# Patient Record
Sex: FEMALE | Race: BLACK OR AFRICAN AMERICAN | NOT HISPANIC OR LATINO | URBAN - METROPOLITAN AREA
[De-identification: names, ages, dates, MRNs, and addresses within clinical notes are randomized per-mention and may not be internally consistent; named-entity substitution may affect disease eponyms.]

---

## 2018-06-09 ENCOUNTER — EMERGENCY (EMERGENCY)
Facility: HOSPITAL | Age: 58
LOS: 1 days | Discharge: HOME | End: 2018-06-09
Attending: EMERGENCY MEDICINE | Admitting: EMERGENCY MEDICINE
Payer: COMMERCIAL

## 2018-06-09 VITALS
SYSTOLIC BLOOD PRESSURE: 127 MMHG | DIASTOLIC BLOOD PRESSURE: 52 MMHG | TEMPERATURE: 98 F | HEART RATE: 91 BPM | RESPIRATION RATE: 18 BRPM

## 2018-06-09 VITALS
TEMPERATURE: 98 F | SYSTOLIC BLOOD PRESSURE: 125 MMHG | OXYGEN SATURATION: 100 % | RESPIRATION RATE: 18 BRPM | DIASTOLIC BLOOD PRESSURE: 59 MMHG | HEART RATE: 76 BPM

## 2018-06-09 DIAGNOSIS — N81.4 UTEROVAGINAL PROLAPSE, UNSPECIFIED: Chronic | ICD-10-CM

## 2018-06-09 PROCEDURE — 99284 EMERGENCY DEPT VISIT MOD MDM: CPT

## 2018-06-09 PROCEDURE — 93971 EXTREMITY STUDY: CPT | Mod: 26,LT

## 2018-06-09 RX ORDER — ACETAMINOPHEN 500 MG
650 TABLET ORAL ONCE
Qty: 0 | Refills: 0 | Status: COMPLETED | OUTPATIENT
Start: 2018-06-09 | End: 2018-06-09

## 2018-06-09 RX ADMIN — Medication 300 MILLIGRAM(S): at 20:24

## 2018-06-09 NOTE — ED PROVIDER NOTE - PLAN OF CARE
during your ED visit you were evaluated for Right lower extremity pain. You had an ultrasound which was negative. Take clindamycin 300mg every 8 hours for 7 days. Take lactobacillus or other probiotic while on clinda. Follow up with your pcp. Take tylenol as needed for pain. Return to the ED if you exhibit any new, continued or worsening symptoms.

## 2018-06-09 NOTE — ED PROVIDER NOTE - CARE PLAN
Principal Discharge DX:	Cellulitis  Assessment and plan of treatment:	during your ED visit you were evaluated for Right lower extremity pain. You had an ultrasound which was negative. Take clindamycin 300mg every 8 hours for 7 days. Take lactobacillus or other probiotic while on clinda. Follow up with your pcp. Take tylenol as needed for pain. Return to the ED if you exhibit any new, continued or worsening symptoms.

## 2018-06-09 NOTE — ED PROVIDER NOTE - OBJECTIVE STATEMENT
57 y/o F with no PMH p/w RLE pain x 1 day. pt. states she woke up with pain in her right calf. Pain is located over the posterior right side of her calf. She states the area is warm and tender. Denies fever, chills, chest pain, SOB, nausea, vomiting, diarrhea. She states pain is located over the calf. She denies smoking, or trauma to the leg. 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

## 2018-06-09 NOTE — ED PROVIDER NOTE - ATTENDING CONTRIBUTION TO CARE
Seen and examined, RLE redness/swelling, mild tenderness, no fluctuance, no discharge, no skin lesions, no crusting. Good distal pulses, normal sensation.

## 2018-06-09 NOTE — ED PROVIDER NOTE - PROGRESS NOTE DETAILS
JW No evidence of DVT.  Cellulitis.  Repeat exam:   RLE Blanching red skin rash.  No ulceration or obvious deformity.  Skin otherwise  normal tone for race.  Sensation and motor function in the distal extremity intact.  2+ tibial and pedal pulses.  No pallor, poikilothermia, paresthesia, paralysis, pulselessness, or cyanosis.  No pain out of proportion to exam.  Extremity compartments soft without any sign of compartment syndrome.  PT prescribed clindamycin.  I reviewed the alarm symptoms of this patient's diagnosis and discussed criteria for their return to the emergency department.  I instructed the patient to return to the emergency department with any alarm symptoms for their specific diagnosis including fevers, chills, pain, any worsening symptoms, and any other concerns.  I instructed this patient to call their primary doctor today, to inform them of their visit to the emergency department, and to obtain a repeat evaluation in the next 24 hours.  This patient understood and agreed with our plan for follow up and felt safe returning home.  At the time of discharge this patient remained in stable condition, in no acute distress, with stable vital signs.

## 2018-06-09 NOTE — ED ADULT NURSE NOTE - OBJECTIVE STATEMENT
Alert and oriented x 4. Pt received to spot 13 due to right calf pain. Pt states : " I suddenly have right lower calf redness and swelling. " Pt right lower leg is red and tender to touch. Pt denies chest pain ,shortness of breath, nausea, vomiting or dizziness. No painful urination or diarrhea. MD at bedside. Pt awaiting ultrasound. Will continue to monitor. RN Break Coverage.

## 2021-06-10 ENCOUNTER — RESULT REVIEW (OUTPATIENT)
Age: 61
End: 2021-06-10

## 2022-09-29 PROBLEM — Z00.00 ENCOUNTER FOR PREVENTIVE HEALTH EXAMINATION: Status: ACTIVE | Noted: 2022-09-29

## 2022-10-03 ENCOUNTER — APPOINTMENT (OUTPATIENT)
Dept: SURGERY | Facility: CLINIC | Age: 62
End: 2022-10-03

## 2022-10-03 VITALS
DIASTOLIC BLOOD PRESSURE: 83 MMHG | SYSTOLIC BLOOD PRESSURE: 138 MMHG | BODY MASS INDEX: 29.02 KG/M2 | HEART RATE: 62 BPM | WEIGHT: 170 LBS | HEIGHT: 64 IN

## 2022-10-03 DIAGNOSIS — Z78.9 OTHER SPECIFIED HEALTH STATUS: ICD-10-CM

## 2022-10-03 DIAGNOSIS — N60.99 UNSPECIFIED BENIGN MAMMARY DYSPLASIA OF UNSPECIFIED BREAST: ICD-10-CM

## 2022-10-03 DIAGNOSIS — Z01.818 ENCOUNTER FOR OTHER PREPROCEDURAL EXAMINATION: ICD-10-CM

## 2022-10-03 DIAGNOSIS — E11.9 TYPE 2 DIABETES MELLITUS W/OUT COMPLICATIONS: ICD-10-CM

## 2022-10-03 PROCEDURE — 99203 OFFICE O/P NEW LOW 30 MIN: CPT

## 2022-10-03 RX ORDER — METFORMIN HYDROCHLORIDE 625 MG/1
TABLET ORAL
Refills: 0 | Status: ACTIVE | COMMUNITY

## 2022-10-04 NOTE — REASON FOR VISIT
[Consultation] : a consultation visit [FreeTextEntry1] : R Breast ADH, involving Intraductal Papilloma

## 2022-10-04 NOTE — PLAN
[FreeTextEntry1] : Ms. YAHAIRA BAILEY was informed of significance of findings. All the options, risks and benefits were explained. Informed consent for excision of right breast lesion (ADH/Intraductal Papilloma) with pre op spot localization and potential risks, benefits and alternatives to the planned surgery were discussed in depth. All surgical options were discussed including non-surgical treatments. She wishes to proceed with surgery. We will plan for surgery on at the next available date, pending any required insurance pre-certification or pre-approval. She agrees to obtain any necessary pre-operative evaluations and testing prior to surgery.\par Patient advised to seek immediate medical attention with any acute change in symptoms or with the development of any new or worsening symptoms. Patient's questions and concerns addressed to patient's satisfaction, and patient verbalized an understanding of the information discussed.\par \par \par

## 2022-10-04 NOTE — DATA REVIEWED
[FreeTextEntry1] : Patient: YAHAIRA BAILEY\par YOB: 1960\par Phone: [phone]\par MRN: [id number] Acc: [id number]\par Date of Exam: 08-\par  \par EXAM: DIGITAL BILATERAL SCREENING MAMMOGRAM WITH CAD AND TOMOSYNTHESIS AND BREAST ULTRASOUND\par \par HISTORY: Screening\par \par CLINICAL BREAST EXAMINATION: The patient reports that her last clinical breast exam was within the past year. \par \par COMPARISON: 2021, 2017, 2011.\par \par MAMMOGRAM:\par \par TECHNIQUE: The following views were obtained digitally: bilateral craniocaudal, bilateral mediolateral oblique. Low-dose full-field digital breast tomosynthesis examination was performed with tomosynthesis acquisitions and synthesized 2D reconstructed mammogram. Computer-aided detection (CAD) was utilized.  \par \par FINDINGS: \par BREAST COMPOSITION: There are scattered areas of fibroglandular density.\par \par No suspicious masses, architectural distortion, or significant calcifications are detected.\par \par BREAST ULTRASOUND:\par \par TECHNIQUE: A bilateral breast ultrasound was performed with complete evaluation of the four quadrants, retroareolar region, and axilla. \par \par FINDINGS: \par Right breast 8:00 1 cm from the nipple is a 0.2 cm complicated cyst, stable compared to 6/17/2021 ultrasound. \par Right breast 10:00 1 cm from the nipple there is a 0.3 cm complicated cyst, indeterminate. Ultrasound-guided biopsy is recommended.\par Right breast 11:00 1 cm from the nipple is a 0.3 cm complicated cyst, stable compared to 6/17/21 ultrasound.\par Left breast 12:00 1 cm from the nipple there is a 0.2 cm cyst.\par No other solid or cystic mass is seen in either breast. No axillary adenopathy.\par \par IMPRESSION: \par 1. Complicated cyst at right 10:00 1 cm from the nipple is indeterminate. Ultrasound-guided biopsy is recommended.\par 2. No suspicious mammographic findings.\par FOLLOW-UP: Ultrasound guided biopsy.\par \par \par \par Patient: YAHAIRA BAILEY\par YOB: 1960\par Date of Exam: 08-\par  \par EXAM:  ULTRASOUND-GUIDED CORE BIOPSY 1 SITE\par \par HISTORY:  Further evaluation with an ultrasound-guided core biopsy was recommended for a right 10 o'clock  axis lesion.\par \par COMPARISON:  Prior breast imaging studies dated 8/12/2022 \par \par PROCEDURE: Targeted ultrasound performed prior to the procedure reconfirms the suspicious finding: at the right 10 o'clock location, 1 cm from the nipple. \par \par The risks and benefits of the procedure were conveyed to the patient, and the patient consented to the procedure. Universal timeout was performed.\par \par Using sterile technique, 10 mL of lidocaine was administered. A skin incision was made and an introducer was placed prior to insertion of the biopsy needle. Under sonographic visualization, a 13-gauge [employer] Marquee spring-loaded core biopsy device was used to sample the target. 5 samples were obtained. A ribbon shaped biopsy clip was deployed at the biopsy site.\par \par A postprocedure mammogram demonstrates appropriate placement of the clip. \par \par The patient tolerated the procedure well without complications. The patient was given postbiopsy care instructions. The specimen was subsequently sent to the pathology lab. \par \par IMPRESSION:  1 site ultrasound-guided core biopsy was performed. \par \par PATHOLOGY\par The pathology report for ultrasound-guided core biopsy of a right 10 o'clock  axis lesion was performed by the pathology department at [facility] as atypical ductal hyperplasia involving intraductal papilloma.\par In view of the histology results, recommend surgical consultation for excisional biopsy.

## 2022-10-04 NOTE — CONSULT LETTER
[Dear  ___] : Dear  [unfilled], [Consult Letter:] : I had the pleasure of evaluating your patient, [unfilled]. [Consult Closing:] : Thank you very much for allowing me to participate in the care of this patient.  If you have any questions, please do not hesitate to contact me. [Sincerely,] : Sincerely, [FreeTextEntry3] : Jovani Sevilla MD\par

## 2022-10-04 NOTE — HISTORY OF PRESENT ILLNESS
[de-identified] : YAHAIRA BAILEY is a 62 year old F w PMHX T2DM,  who is referred to the office for consultation visit, she presents w the cc of having abnormal finding w recent breast imaging. Patient states this was her first breast biopsy. No personal history of breast CA. Family history of breast CA includes, sister.\par Patient had US guided core biopsy of the right breast at 10:00, 08/31/22 c/w ADH involving intraductal papilloma. \par Patient denies feeling any masses or lumps. No pain. No breast/nipple discharge reported. \par

## 2022-10-04 NOTE — PHYSICAL EXAM
[No Rash or Lesion] : No rash or lesion [Alert] : alert [Oriented to Person] : oriented to person [Oriented to Place] : oriented to place [Oriented to Time] : oriented to time [Calm] : calm [de-identified] : A/Ox3; NAD. appears comfortable [de-identified] : EOMI; sclera anicteric. [de-identified] : no cervical lymphadenopathy  [de-identified] : airway patent, no use of accessory muscles [de-identified] : No chest deformity, No asymmetry, Normal contours,No nodules, No masses, No axillary adenopathy, No nipple discharge,No tenderness\par  [de-identified] : abd is soft, NT/ND\par  [de-identified] : +ROM, normal gait

## 2022-10-04 NOTE — ASSESSMENT
[FreeTextEntry1] : Ms. BAILEY is a 62 year y/o F who presents w ADH and Intraductal Papilloma of the Right Breast at 10:00.

## 2022-10-12 ENCOUNTER — OUTPATIENT (OUTPATIENT)
Dept: OUTPATIENT SERVICES | Facility: HOSPITAL | Age: 62
LOS: 1 days | End: 2022-10-12
Payer: COMMERCIAL

## 2022-10-12 VITALS
SYSTOLIC BLOOD PRESSURE: 147 MMHG | RESPIRATION RATE: 16 BRPM | HEIGHT: 64 IN | OXYGEN SATURATION: 97 % | TEMPERATURE: 98 F | DIASTOLIC BLOOD PRESSURE: 84 MMHG | WEIGHT: 171.96 LBS | HEART RATE: 61 BPM

## 2022-10-12 DIAGNOSIS — N60.99 UNSPECIFIED BENIGN MAMMARY DYSPLASIA OF UNSPECIFIED BREAST: ICD-10-CM

## 2022-10-12 DIAGNOSIS — Z98.891 HISTORY OF UTERINE SCAR FROM PREVIOUS SURGERY: Chronic | ICD-10-CM

## 2022-10-12 DIAGNOSIS — D24.1 BENIGN NEOPLASM OF RIGHT BREAST: ICD-10-CM

## 2022-10-12 DIAGNOSIS — E11.9 TYPE 2 DIABETES MELLITUS WITHOUT COMPLICATIONS: ICD-10-CM

## 2022-10-12 DIAGNOSIS — Z29.9 ENCOUNTER FOR PROPHYLACTIC MEASURES, UNSPECIFIED: ICD-10-CM

## 2022-10-12 DIAGNOSIS — Z01.818 ENCOUNTER FOR OTHER PREPROCEDURAL EXAMINATION: ICD-10-CM

## 2022-10-12 DIAGNOSIS — N81.4 UTEROVAGINAL PROLAPSE, UNSPECIFIED: Chronic | ICD-10-CM

## 2022-10-12 LAB
ANION GAP SERPL CALC-SCNC: 6 MMOL/L — SIGNIFICANT CHANGE UP (ref 5–17)
APTT BLD: 35.4 SEC — SIGNIFICANT CHANGE UP (ref 27.5–35.5)
BUN SERPL-MCNC: 11 MG/DL — SIGNIFICANT CHANGE UP (ref 7–18)
CALCIUM SERPL-MCNC: 9.2 MG/DL — SIGNIFICANT CHANGE UP (ref 8.4–10.5)
CHLORIDE SERPL-SCNC: 106 MMOL/L — SIGNIFICANT CHANGE UP (ref 96–108)
CO2 SERPL-SCNC: 28 MMOL/L — SIGNIFICANT CHANGE UP (ref 22–31)
CREAT SERPL-MCNC: 0.69 MG/DL — SIGNIFICANT CHANGE UP (ref 0.5–1.3)
EGFR: 97 ML/MIN/1.73M2 — SIGNIFICANT CHANGE UP
GLUCOSE SERPL-MCNC: 110 MG/DL — HIGH (ref 70–99)
HCT VFR BLD CALC: 43.9 % — SIGNIFICANT CHANGE UP (ref 34.5–45)
HGB BLD-MCNC: 13.6 G/DL — SIGNIFICANT CHANGE UP (ref 11.5–15.5)
INR BLD: 1.07 RATIO — SIGNIFICANT CHANGE UP (ref 0.88–1.16)
MCHC RBC-ENTMCNC: 27.7 PG — SIGNIFICANT CHANGE UP (ref 27–34)
MCHC RBC-ENTMCNC: 31 GM/DL — LOW (ref 32–36)
MCV RBC AUTO: 89.4 FL — SIGNIFICANT CHANGE UP (ref 80–100)
NRBC # BLD: 0 /100 WBCS — SIGNIFICANT CHANGE UP (ref 0–0)
PLATELET # BLD AUTO: 328 K/UL — SIGNIFICANT CHANGE UP (ref 150–400)
POTASSIUM SERPL-MCNC: 4.1 MMOL/L — SIGNIFICANT CHANGE UP (ref 3.5–5.3)
POTASSIUM SERPL-SCNC: 4.1 MMOL/L — SIGNIFICANT CHANGE UP (ref 3.5–5.3)
PROTHROM AB SERPL-ACNC: 12.8 SEC — SIGNIFICANT CHANGE UP (ref 10.5–13.4)
RBC # BLD: 4.91 M/UL — SIGNIFICANT CHANGE UP (ref 3.8–5.2)
RBC # FLD: 14.3 % — SIGNIFICANT CHANGE UP (ref 10.3–14.5)
SODIUM SERPL-SCNC: 140 MMOL/L — SIGNIFICANT CHANGE UP (ref 135–145)
WBC # BLD: 5.62 K/UL — SIGNIFICANT CHANGE UP (ref 3.8–10.5)
WBC # FLD AUTO: 5.62 K/UL — SIGNIFICANT CHANGE UP (ref 3.8–10.5)

## 2022-10-12 PROCEDURE — 36415 COLL VENOUS BLD VENIPUNCTURE: CPT

## 2022-10-12 PROCEDURE — 85027 COMPLETE CBC AUTOMATED: CPT

## 2022-10-12 PROCEDURE — G0463: CPT

## 2022-10-12 PROCEDURE — 80048 BASIC METABOLIC PNL TOTAL CA: CPT

## 2022-10-12 PROCEDURE — 85610 PROTHROMBIN TIME: CPT

## 2022-10-12 PROCEDURE — 85730 THROMBOPLASTIN TIME PARTIAL: CPT

## 2022-10-12 NOTE — H&P PST ADULT - PROBLEM SELECTOR PLAN 1
Robotic Assisted Total Laparoscopic Hysterectomy with Bilateral Salpingo - Oophorectomy  [place] Lymph Node Mapping and Dissection  Cystoscopy      STOP BANG : 2   Low risk for SHIVA complications

## 2022-10-12 NOTE — H&P PST ADULT - HISTORY OF PRESENT ILLNESS
61 yo female with history of DM, Asthma (last hospitalization over 30 years, never been intubated), reports the above.  Patient states her routine mammo was abnormal, Biopsy of the right breast revealed Atypical Ductal Hyperplasia involving intraductal papilloma,   She is scheduled for : Right Breast Pre-Op Needle Localization and Excision of Lesion, 10/19/22

## 2022-10-12 NOTE — H&P PST ADULT - NSICDXFAMILYHX_GEN_ALL_CORE_FT
FAMILY HISTORY:  Father  Still living? Yes, Estimated age: Age Unknown  FH: testicular cancer, Age at diagnosis: Age Unknown    Mother  Still living? No  FH: hypertension, Age at diagnosis: Age Unknown    Sibling  Still living? Yes, Estimated age: Age Unknown  FH: breast cancer, Age at diagnosis: Age Unknown

## 2022-10-12 NOTE — H&P PST ADULT - ASSESSMENT
61 yo female is scheduled for : Right Breast Pre-Op Needle Localization and Excision of Lesion, on 10/19/22

## 2022-10-17 LAB — SARS-COV-2 N GENE NPH QL NAA+PROBE: NOT DETECTED

## 2022-10-18 ENCOUNTER — TRANSCRIPTION ENCOUNTER (OUTPATIENT)
Age: 62
End: 2022-10-18

## 2022-10-19 ENCOUNTER — TRANSCRIPTION ENCOUNTER (OUTPATIENT)
Age: 62
End: 2022-10-19

## 2022-10-19 ENCOUNTER — RESULT REVIEW (OUTPATIENT)
Age: 62
End: 2022-10-19

## 2022-10-19 ENCOUNTER — APPOINTMENT (OUTPATIENT)
Dept: SURGERY | Facility: HOSPITAL | Age: 62
End: 2022-10-19

## 2022-10-19 ENCOUNTER — OUTPATIENT (OUTPATIENT)
Dept: OUTPATIENT SERVICES | Facility: HOSPITAL | Age: 62
LOS: 1 days | End: 2022-10-19
Payer: COMMERCIAL

## 2022-10-19 VITALS
RESPIRATION RATE: 16 BRPM | OXYGEN SATURATION: 100 % | HEART RATE: 56 BPM | SYSTOLIC BLOOD PRESSURE: 118 MMHG | DIASTOLIC BLOOD PRESSURE: 65 MMHG | TEMPERATURE: 97 F

## 2022-10-19 VITALS
DIASTOLIC BLOOD PRESSURE: 74 MMHG | TEMPERATURE: 98 F | RESPIRATION RATE: 16 BRPM | HEIGHT: 64 IN | HEART RATE: 62 BPM | WEIGHT: 171.96 LBS | OXYGEN SATURATION: 100 % | SYSTOLIC BLOOD PRESSURE: 116 MMHG

## 2022-10-19 DIAGNOSIS — D24.1 BENIGN NEOPLASM OF RIGHT BREAST: ICD-10-CM

## 2022-10-19 DIAGNOSIS — Z01.818 ENCOUNTER FOR OTHER PREPROCEDURAL EXAMINATION: ICD-10-CM

## 2022-10-19 DIAGNOSIS — Z98.891 HISTORY OF UTERINE SCAR FROM PREVIOUS SURGERY: Chronic | ICD-10-CM

## 2022-10-19 DIAGNOSIS — N60.99 UNSPECIFIED BENIGN MAMMARY DYSPLASIA OF UNSPECIFIED BREAST: ICD-10-CM

## 2022-10-19 DIAGNOSIS — N81.4 UTEROVAGINAL PROLAPSE, UNSPECIFIED: Chronic | ICD-10-CM

## 2022-10-19 LAB
GLUCOSE BLDC GLUCOMTR-MCNC: 101 MG/DL — HIGH (ref 70–99)
GLUCOSE BLDC GLUCOMTR-MCNC: 118 MG/DL — HIGH (ref 70–99)

## 2022-10-19 PROCEDURE — 19281 PERQ DEVICE BREAST 1ST IMAG: CPT | Mod: RT

## 2022-10-19 PROCEDURE — 76098 X-RAY EXAM SURGICAL SPECIMEN: CPT

## 2022-10-19 PROCEDURE — 19281 PERQ DEVICE BREAST 1ST IMAG: CPT

## 2022-10-19 PROCEDURE — 19125 EXCISION BREAST LESION: CPT

## 2022-10-19 PROCEDURE — 76098 X-RAY EXAM SURGICAL SPECIMEN: CPT | Mod: 26

## 2022-10-19 PROCEDURE — 82962 GLUCOSE BLOOD TEST: CPT

## 2022-10-19 PROCEDURE — 88305 TISSUE EXAM BY PATHOLOGIST: CPT | Mod: 26

## 2022-10-19 PROCEDURE — 88305 TISSUE EXAM BY PATHOLOGIST: CPT

## 2022-10-19 PROCEDURE — 19125 EXCISION BREAST LESION: CPT | Mod: RT

## 2022-10-19 RX ORDER — FENTANYL CITRATE 50 UG/ML
25 INJECTION INTRAVENOUS
Refills: 0 | Status: DISCONTINUED | OUTPATIENT
Start: 2022-10-19 | End: 2022-10-19

## 2022-10-19 RX ORDER — ONDANSETRON 8 MG/1
4 TABLET, FILM COATED ORAL ONCE
Refills: 0 | Status: DISCONTINUED | OUTPATIENT
Start: 2022-10-19 | End: 2022-10-19

## 2022-10-19 RX ORDER — METFORMIN HYDROCHLORIDE 850 MG/1
1 TABLET ORAL
Qty: 0 | Refills: 0 | DISCHARGE

## 2022-10-19 RX ORDER — SODIUM CHLORIDE 9 MG/ML
3 INJECTION INTRAMUSCULAR; INTRAVENOUS; SUBCUTANEOUS EVERY 8 HOURS
Refills: 0 | Status: DISCONTINUED | OUTPATIENT
Start: 2022-10-19 | End: 2022-10-19

## 2022-10-19 NOTE — ASU PREOP CHECKLIST - ALLERGY BAND ON
Shift summary: Pt. Up at [place] to the bathroom as needed. Call light within reach. Scd's in use. Dressing to incision changed as MD ordered. Pain medication given before dressing change. No complaints of pain/nausea or signs of distress. Family bedside.      Patient Vitals for the past 12 hrs:   Temp Pulse Resp BP SpO2   02/25/17 2306 97.8 °F (36.6 °C) 79 18 108/65 98 %   02/25/17 1933 97.9 °F (36.6 °C) 79 18 108/65 100 %   02/25/17 1636 97.2 °F (36.2 °C) 81 17 103/67 100 % no known allergies

## 2022-10-19 NOTE — BRIEF OPERATIVE NOTE - NSICDXBRIEFPROCEDURE_GEN_ALL_CORE_FT
PROCEDURES:  Lumpectomy of right breast with needle localization 19-Oct-2022 11:01:56  Dionne Mathew

## 2022-10-19 NOTE — ASU DISCHARGE PLAN (ADULT/PEDIATRIC) - NS MD DC FALL RISK RISK
For information on Fall & Injury Prevention, visit: [email or web address] OR  [email or web address] OR  https://www.cdc.gov/steadi/patient.html

## 2022-10-19 NOTE — ASU DISCHARGE PLAN (ADULT/PEDIATRIC) - CARE PROVIDER_API CALL
Jovani Sevilla (MD)  Surgery  [address] [place], [place] [id number]  Phone: [phone]  Fax: [phone]  Follow Up Time:

## 2022-10-21 LAB — SURGICAL PATHOLOGY STUDY: SIGNIFICANT CHANGE UP

## 2022-10-25 PROBLEM — J30.2 OTHER SEASONAL ALLERGIC RHINITIS: Chronic | Status: ACTIVE | Noted: 2022-10-12

## 2022-10-25 PROBLEM — E11.9 TYPE 2 DIABETES MELLITUS WITHOUT COMPLICATIONS: Chronic | Status: ACTIVE | Noted: 2022-10-12

## 2022-10-31 ENCOUNTER — APPOINTMENT (OUTPATIENT)
Dept: SURGERY | Facility: CLINIC | Age: 62
End: 2022-10-31

## 2022-10-31 DIAGNOSIS — D24.1 BENIGN NEOPLASM OF RIGHT BREAST: ICD-10-CM

## 2022-10-31 PROCEDURE — 99024 POSTOP FOLLOW-UP VISIT: CPT

## 2022-10-31 NOTE — REASON FOR VISIT
[Post Op: _________] : a [unfilled] post op visit [FreeTextEntry1] : S/P Preop needle localization  and excision of right breast atypical ductal hyperplasia and intraductal papilloma, 10/19/22

## 2022-10-31 NOTE — HISTORY OF PRESENT ILLNESS
[de-identified] : YAHAIRA BAILEY presents to the office for postoperative visit today, she is S/P Preop needle localization  and excision of right breast atypical ductal hyperplasia and intraductal papilloma, 10/19/22. Path c/w intraductal papilloma. Patient states she has some hardness and tenderness to the incision site.

## 2022-10-31 NOTE — PHYSICAL EXAM
[No Rash or Lesion] : No rash or lesion [Alert] : alert [Oriented to Person] : oriented to person [Oriented to Place] : oriented to place [Oriented to Time] : oriented to time [Calm] : calm [de-identified] : A/Ox3; NAD. appears comfortable [de-identified] : wound healing well , no drainage or erythema. No infection noted. \par

## 2022-10-31 NOTE — PLAN
[FreeTextEntry1] : potential for seroma formation discussed \par patient will follow up if she notices increased swelling or hardness to incision site \par copy of pathology results provided to the patient \par \par patient will follow up if needed. Warning signs, follow up, and restrictions were discussed with the patient.\par Patient's questions and concerns addressed to their satisfaction, and patient verbalized an understanding of the information discussed.\par

## 2022-10-31 NOTE — ASSESSMENT
[FreeTextEntry1] : Ms. BAILEY is a 62 year y/o F, S/P Preop needle localization  and excision of right breast atypical ductal hyperplasia and intraductal papilloma, 10/19/22. Patient has some sensitivity and hardness to incision site. No pain. \par \par Incision site is healing well and as expected. There is no evidence of infection/complication, and patient is progressing as expected. Post-operative wound care, activities, restrictions and precautions were reinforced. Pathology results were discussed in detail. Patient's questions and concerns addressed to patient's satisfaction.\par

## 2024-05-29 ENCOUNTER — APPOINTMENT (OUTPATIENT)
Dept: OBGYN | Facility: CLINIC | Age: 64
End: 2024-05-29
Payer: COMMERCIAL

## 2024-05-29 VITALS
SYSTOLIC BLOOD PRESSURE: 135 MMHG | BODY MASS INDEX: 29.02 KG/M2 | WEIGHT: 170 LBS | DIASTOLIC BLOOD PRESSURE: 84 MMHG | HEIGHT: 64 IN

## 2024-05-29 DIAGNOSIS — N81.4 UTEROVAGINAL PROLAPSE, UNSPECIFIED: ICD-10-CM

## 2024-05-29 DIAGNOSIS — E11.9 TYPE 2 DIABETES MELLITUS W/OUT COMPLICATIONS: ICD-10-CM

## 2024-05-29 DIAGNOSIS — K21.9 GASTRO-ESOPHAGEAL REFLUX DISEASE W/OUT ESOPHAGITIS: ICD-10-CM

## 2024-05-29 PROCEDURE — 99214 OFFICE O/P EST MOD 30 MIN: CPT

## 2024-05-29 RX ORDER — LISINOPRIL 30 MG/1
TABLET ORAL
Refills: 0 | Status: ACTIVE | COMMUNITY

## 2024-05-29 RX ORDER — OMEPRAZOLE 20 MG/1
TABLET, DELAYED RELEASE ORAL
Refills: 0 | Status: ACTIVE | COMMUNITY

## 2024-05-29 NOTE — PLAN
[FreeTextEntry1] : discussed possibilities of surgery . She has to control sugar , get the or report prior to surgery .

## 2024-05-29 NOTE — HISTORY OF PRESENT ILLNESS
[FreeTextEntry1] : pt comes for surgery . She has a uterine prolapse . They tried to fix it 10 years ago robotically but not surger of the surgery and how it was done . She said that they had adhesions and the surgery took a few hrs but not complicated Her diabetes is not well controlled with HGA1c of slightly over 8 . I discussed the dangers with surgery when the sugars are not controlled and the risk of infection and mesh erosion .

## 2024-09-13 NOTE — ASU PREOP CHECKLIST - NOTHING BY MOUTH SINCE
Caller: EUSEBIO ESPINOZA    Relationship: Emergency Contact    Best call back number: [phone]     What is the best time to reach you: ANY    Who are you requesting to speak with (clinical staff, provider,  specific staff member): NONE SPECIFIED     What was the call regarding:     PATIENT'S WIFE STATES PATIENT'S AFTER VISIT SUMMARY FROM 09.13.24 APPOINTMENT HAS HIS PRESCRIPTIONS GOING TO [facility]. PATIENT'S WIFE STATES THAT ALL PRESCRIPTIONS FROM 09.13.24 APPOINTMENT SHOULD GO TO [facility].    PLEASE FOLLOW-UP WITH PATIENT'S WIFE WHEN PRESCRIPTIONS ARE TRANSFERRED TO [facility].     Is it okay if the provider responds through [employer]: PLEASE CALL     
18-Oct-2022 20:00

## (undated) DEVICE — SUT POLYSORB 3-0 30" V-20 UNDYED

## (undated) DEVICE — WARMING BLANKET LOWER ADULT

## (undated) DEVICE — DRAPE LIGHT HANDLE COVER (BLUE)

## (undated) DEVICE — GLV 7 PROTEXIS (WHITE)

## (undated) DEVICE — DRSG MASTISOL

## (undated) DEVICE — FOR-ESU VALLEYLAB T7E14999DX: Type: DURABLE MEDICAL EQUIPMENT

## (undated) DEVICE — SOL IRR POUR H2O 1500ML

## (undated) DEVICE — SUT SOFSILK 2-0 30" V-20

## (undated) DEVICE — PACK MINOR NO DRAPE

## (undated) DEVICE — VENODYNE/SCD SLEEVE CALF MEDIUM

## (undated) DEVICE — GLV 7 PROTEXIS (BLUE)

## (undated) DEVICE — ELCTR GROUNDING PAD ADULT COVIDIEN

## (undated) DEVICE — DRSG STERISTRIPS 0.5 X 4"

## (undated) DEVICE — DRAPE LAPAROTOMY TRANSVERSE

## (undated) DEVICE — DRSG TEGADERM 4X4.75"

## (undated) DEVICE — DRSG CURITY GAUZE SPONGE 4 X 4" 12-PLY

## (undated) DEVICE — SUT POLYSORB 4-0 27" P-12 UNDYED